# Patient Record
Sex: MALE | Race: OTHER | HISPANIC OR LATINO | URBAN - METROPOLITAN AREA
[De-identification: names, ages, dates, MRNs, and addresses within clinical notes are randomized per-mention and may not be internally consistent; named-entity substitution may affect disease eponyms.]

---

## 2017-08-12 ENCOUNTER — EMERGENCY (EMERGENCY)
Facility: HOSPITAL | Age: 57
LOS: 1 days | Discharge: HOME | End: 2017-08-12
Attending: EMERGENCY MEDICINE | Admitting: EMERGENCY MEDICINE
Payer: COMMERCIAL

## 2017-08-12 VITALS
SYSTOLIC BLOOD PRESSURE: 126 MMHG | HEART RATE: 82 BPM | OXYGEN SATURATION: 98 % | DIASTOLIC BLOOD PRESSURE: 84 MMHG | TEMPERATURE: 98 F | RESPIRATION RATE: 16 BRPM

## 2017-08-12 VITALS
HEART RATE: 90 BPM | TEMPERATURE: 98 F | DIASTOLIC BLOOD PRESSURE: 72 MMHG | RESPIRATION RATE: 18 BRPM | OXYGEN SATURATION: 96 % | SYSTOLIC BLOOD PRESSURE: 118 MMHG

## 2017-08-12 PROCEDURE — 99283 EMERGENCY DEPT VISIT LOW MDM: CPT

## 2017-08-12 PROCEDURE — 99285 EMERGENCY DEPT VISIT HI MDM: CPT

## 2017-08-12 NOTE — ED PROVIDER NOTE - MEDICAL DECISION MAKING DETAILS
etoh intox, no signs of trauma, fs stable, observed until sobriety, hemodynamically stable / safe for dc, detox info offered - pt refused

## 2017-08-12 NOTE — ED ADULT NURSE NOTE - OBJECTIVE STATEMENT
Patient brought in by EMS, found lying in street, odor of ETOH noted.  Patient arrives stuporous, does not appear to be in any pain or distress, slurred speech, no obvious signs of trauma, vital signs stable, POC glucose 210.  Fall precaution observed.

## 2017-08-12 NOTE — ED PROVIDER NOTE - OBJECTIVE STATEMENT
The pt is a 58 y/o M, BIBA, for intox - pt refusing to quantify amount ingested. Refusing to answer questions. No signs of trauma

## 2017-08-12 NOTE — ED PROVIDER NOTE - PROGRESS NOTE DETAILS
aao x 3, ambulated w/steady gait, demanding sandwich and being disruptive towards other pts, stable for dc at this time

## 2017-08-12 NOTE — ED PROVIDER NOTE - ATTENDING CONTRIBUTION TO CARE
pt seen and examined by me, agree with above. 56 yo male biba for intox.  no signs of trauma.  pt sleepy but easily arousable, asking for a sandwich.  aob.  normal heart and lungs, moving all extremities equally.  will observe for clinical sobriety.

## 2017-08-12 NOTE — ED PROVIDER NOTE - NEUROLOGICAL, MLM
awake but somnolent, follows commands but slurring speech and unsteady gait at this time - will re eval once clinically sober

## 2017-08-15 ENCOUNTER — EMERGENCY (EMERGENCY)
Facility: HOSPITAL | Age: 57
LOS: 1 days | Discharge: HOME | End: 2017-08-15
Attending: EMERGENCY MEDICINE | Admitting: EMERGENCY MEDICINE
Payer: COMMERCIAL

## 2017-08-15 VITALS
DIASTOLIC BLOOD PRESSURE: 85 MMHG | TEMPERATURE: 97 F | RESPIRATION RATE: 18 BRPM | HEART RATE: 77 BPM | OXYGEN SATURATION: 99 % | SYSTOLIC BLOOD PRESSURE: 132 MMHG

## 2017-08-15 VITALS
TEMPERATURE: 98 F | DIASTOLIC BLOOD PRESSURE: 74 MMHG | HEART RATE: 80 BPM | SYSTOLIC BLOOD PRESSURE: 117 MMHG | OXYGEN SATURATION: 96 % | RESPIRATION RATE: 18 BRPM

## 2017-08-15 DIAGNOSIS — I10 ESSENTIAL (PRIMARY) HYPERTENSION: ICD-10-CM

## 2017-08-15 DIAGNOSIS — R41.82 ALTERED MENTAL STATUS, UNSPECIFIED: ICD-10-CM

## 2017-08-15 DIAGNOSIS — F10.129 ALCOHOL ABUSE WITH INTOXICATION, UNSPECIFIED: ICD-10-CM

## 2017-08-15 PROCEDURE — 99285 EMERGENCY DEPT VISIT HI MDM: CPT

## 2017-08-15 PROCEDURE — 99283 EMERGENCY DEPT VISIT LOW MDM: CPT

## 2017-08-15 NOTE — ED ADULT NURSE NOTE - CHPI ED SYMPTOMS NEG
no weakness/no vomiting/no nausea/no dizziness/no fever/no chills/no decreased eating/drinking/no tingling/no pain/no numbness

## 2017-08-15 NOTE — ED PROVIDER NOTE - MEDICAL DECISION MAKING DETAILS
57M with hx of alcohol abuse presenting with alcohol intoxication. Vital signs wnl, FSBG wnl. Plan for serial neuro exams, observation, no indication for trauma no e/o head trauma, no e/o any other trauma. Plan for serial neuro exams and discharge when clinically sober.

## 2017-08-15 NOTE — ED PROVIDER NOTE - OBJECTIVE STATEMENT
56 year old male with PMHx of GERD, HTN, Hep C, EtOH abuse, IV drug abuse, and methadone presenting with alcohol intoxication. Hx limited due to alcohol intox, no trauma, brought in by EMS.

## 2017-08-15 NOTE — ED ADULT TRIAGE NOTE - CHIEF COMPLAINT QUOTE
biba for alcohol intox.  Last drink today.  No signs / symptoms of withdrawal, injury noted.  Fingerstick in progress.

## 2017-08-15 NOTE — ED ADULT NURSE REASSESSMENT NOTE - NS ED NURSE REASSESS COMMENT FT1
received report from primary nurse January Cordova,  pt in bed, sleeping , appears conformable,  s/s of distress noted.  VS retaken

## 2017-08-15 NOTE — ED PROVIDER NOTE - PROGRESS NOTE DETAILS
pt eating sandwich, fsbg wnl pt is sitting up talking with slurred speech pt eating sandwich, fsbg wnl sleeping comfortably

## 2017-08-15 NOTE — ED ADULT NURSE NOTE - OBJECTIVE STATEMENT
57 yr old male patient brought in by EMS after being found by bystanders sleeping.  Pt admits to drinking alcohol.  Poor hygiene noted on patient.  Downed in yellow gown.  A+OX3, but lethargic.  Arousable to tactile and verbal stimuli.  No signs of trauma noted.  Calm and cooperative at this time. Requesting food, gave sandwich.  .

## 2017-08-15 NOTE — ED PROVIDER NOTE - PMH
Essential hypertension  Hypertension  Gastroesophageal reflux disease  GERD (gastroesophageal reflux disease)  Hepatitis C virus infection  Hepatitis C virus infection  Hepatitis C virus infection  Hepatitis C virus infection  Mixed, or nondependent drug abuse  IV drug abuse  Nondependent alcohol abuse  ETOH abuse  Opioid dependence  Methadone maintenance therapy patient

## 2017-08-16 DIAGNOSIS — R41.82 ALTERED MENTAL STATUS, UNSPECIFIED: ICD-10-CM

## 2017-08-16 DIAGNOSIS — F10.129 ALCOHOL ABUSE WITH INTOXICATION, UNSPECIFIED: ICD-10-CM
